# Patient Record
Sex: FEMALE | Race: BLACK OR AFRICAN AMERICAN
[De-identification: names, ages, dates, MRNs, and addresses within clinical notes are randomized per-mention and may not be internally consistent; named-entity substitution may affect disease eponyms.]

---

## 2018-08-01 ENCOUNTER — HOSPITAL ENCOUNTER (OUTPATIENT)
Dept: HOSPITAL 74 - JASU-SURG | Age: 53
Discharge: HOME | End: 2018-08-01
Attending: OBSTETRICS & GYNECOLOGY
Payer: COMMERCIAL

## 2018-08-01 VITALS — DIASTOLIC BLOOD PRESSURE: 69 MMHG | SYSTOLIC BLOOD PRESSURE: 115 MMHG | TEMPERATURE: 98 F | HEART RATE: 77 BPM

## 2018-08-01 VITALS — BODY MASS INDEX: 28.3 KG/M2

## 2018-08-01 DIAGNOSIS — N84.0: Primary | ICD-10-CM

## 2018-08-01 PROCEDURE — 0UDB7ZX EXTRACTION OF ENDOMETRIUM, VIA NATURAL OR ARTIFICIAL OPENING, DIAGNOSTIC: ICD-10-PCS | Performed by: OBSTETRICS & GYNECOLOGY

## 2018-08-01 PROCEDURE — 0UJD8ZZ INSPECTION OF UTERUS AND CERVIX, VIA NATURAL OR ARTIFICIAL OPENING ENDOSCOPIC: ICD-10-PCS | Performed by: OBSTETRICS & GYNECOLOGY

## 2018-08-01 PROCEDURE — 0UB97ZX EXCISION OF UTERUS, VIA NATURAL OR ARTIFICIAL OPENING, DIAGNOSTIC: ICD-10-PCS | Performed by: OBSTETRICS & GYNECOLOGY

## 2018-08-01 NOTE — HP
History & Physical Update





- History


History: No Change





- Physical


Physical: No Change





- Assessment


Assessment: No Change





- Plan


Plan: No Change (for hysteroscopic myomectomy, suction D&C for endometrial 

polyp - agree with H&P from 7/17/18)

## 2018-08-01 NOTE — OP
Operative Note





- Note:


Operative Date: 08/01/18


Pre-Operative Diagnosis: endometrial polyp


Operation: hysteroscopy, suction D&C, polypectomy


Findings: 





dense polypoid endometrial tissue, no discrete intracavitary polyps/fibroids


Post-Operative Diagnosis: Same as Pre-op


Surgeon: Sara Hopkins


Anesthesiologist/CRNA: Iker James


Anesthesia: General (with LMA)


Specimens Removed: endometrial currettings, endometrial polyp


Estimated Blood Loss (mls): 5


Operative Report Dictated: Yes

## 2018-08-01 NOTE — OP
DATE OF OPERATION:  08/01/2018

 

PREOPERATIVE DIAGNOSIS:  Endometrial polyp.  

 

POSTOPERATIVE DIAGNOSIS:  Endometrial polypoid tissue. 

 

SURGEON:  Sara Hopkins MD

 

ASSISTANTS:  None.

 

ANESTHESIA:  LMA.  

 

ANESTHESIOLOGIST:  Iker James MD

 

PROCEDURE:  Hysteroscopy, dilatation and curettage with suction, and polypectomy.  

 

COUNTS:  Sponge, needle, and instrument counts correct.  

 

SPECIMENS:  Endometrial polyp and endometrial curettings.  

 

DISPOSITION:  Stable to PACU.  

 

BRIEF HISTORY AND PROCEDURE:  Patient is a 53-year-old female with complaints of

endometrial polyps with a history of sonogram in the office and was counseled on her

options and she signed consent for a hysteroscopic polypectomy, possible myomectomy

and suction dilatation and curettage.  The patient was then admitted to [facility] on August 1, 2018.  Consents were reconfirmed upon admission.  The patient

was then taken back to the operating room where she was placed in the dorsal

lithotomy position, given anesthesia with LMA by Dr. James, and a hard time-out was

performed.  A speculum was placed inside the vagina.  The anterior lip of the cervix

was grasped with a single-tooth tenaculum, and a diagnostic hysteroscope was advanced

to the fundus of the uterus.  Bilateral tubal ostia were noted.  No discrete polyps

or fibroids were noted.  However, dense polypoid tissue in the posterior aspect of

the uterus was appreciated.  At this point, a sharp curettage was completed in all 4

walls of the uterus, paying particular attention to the back posterior wall, and a

suction dilatation and curettage was performed.  Re-evaluation with the diagnostic

hysteroscope revealed no evidence of uterine perforation, and polypoid tissue

appeared to have been removed. Specimens were sent to Pathology for permanent

evaluation.  All instruments were removed from the vagina.  Minimal bleeding was

noted from the cervical os as well as the tenaculum sites.  The patient was awoken

from anesthesia and is recovering in stable condition in the PACU at the time of this

dictation. 

 

 

SARA HOPKINS DO

 

/[id number]

DD: 08/01/2018 11:42

DT: 08/01/2018 13:41

Job #:  [id number]

## 2018-08-02 NOTE — PATH
Surgical Pathology Report



Patient Name:  ROSELYN HAMMOND

Accession #:  [id number]

Med. Rec. #:  [id number]                                                        

   /Age/Gender:  1965 (Age: 53) / F

Account:  [id number]                                                          

             Location: [facility] SURGICAL

Taken:  2018

Received:  2018

Reported:  2018

Physicians:  Sara Hopkins M.D.

  



Specimen(s) Received

 ENDOMETRIAL CURETTINGS AND POLYPS 





Clinical History

Endometrial polyps







Final Diagnosis

ENDOMETRIAL POLYPS AND CURETTINGS:

FRAGMENTS OF ENDOMETRIAL POLYPS. 

 STRIPS OF WEAKLY PROLIFERATIVE ENDOMETRIAL GLANDS AND STROMA. 

UNREMARKABLE CERVICAL SQUAMOUS EPITHELIUM PRESENT. 





***Electronically Signed***

Gio Corrales M.D.





Gross Description

Received in formalin, labeled "endometrial polyps and curettings" are multiple

tan, irregular portions of soft tissue measuring 1.5 cm. in greatest dimension.

The specimens are submitted in toto in one cassette.

TYLER/2018



[email or web address]

## 2022-11-04 ENCOUNTER — HOSPITAL ENCOUNTER (OUTPATIENT)
Dept: HOSPITAL 74 - JASU-ENDO | Age: 57
Discharge: HOME | End: 2022-11-04
Attending: INTERNAL MEDICINE
Payer: COMMERCIAL

## 2022-11-04 VITALS
TEMPERATURE: 98 F | HEART RATE: 63 BPM | DIASTOLIC BLOOD PRESSURE: 65 MMHG | SYSTOLIC BLOOD PRESSURE: 114 MMHG | RESPIRATION RATE: 18 BRPM

## 2022-11-04 VITALS — BODY MASS INDEX: 29.9 KG/M2

## 2022-11-04 DIAGNOSIS — E11.9: ICD-10-CM

## 2022-11-04 DIAGNOSIS — Z12.11: Primary | ICD-10-CM

## 2022-11-04 DIAGNOSIS — Z79.84: ICD-10-CM

## 2022-11-04 DIAGNOSIS — K64.8: ICD-10-CM

## 2022-11-04 DIAGNOSIS — Z86.010: ICD-10-CM

## 2022-11-04 DIAGNOSIS — K62.89: ICD-10-CM

## 2022-11-04 PROCEDURE — 0DJD8ZZ INSPECTION OF LOWER INTESTINAL TRACT, VIA NATURAL OR ARTIFICIAL OPENING ENDOSCOPIC: ICD-10-PCS | Performed by: INTERNAL MEDICINE
